# Patient Record
Sex: FEMALE | Race: BLACK OR AFRICAN AMERICAN | URBAN - METROPOLITAN AREA
[De-identification: names, ages, dates, MRNs, and addresses within clinical notes are randomized per-mention and may not be internally consistent; named-entity substitution may affect disease eponyms.]

---

## 2018-03-29 ENCOUNTER — OFFICE VISIT (OUTPATIENT)
Dept: FAMILY MEDICINE | Facility: CLINIC | Age: 33
End: 2018-03-29

## 2018-03-29 VITALS
OXYGEN SATURATION: 98 % | DIASTOLIC BLOOD PRESSURE: 62 MMHG | BODY MASS INDEX: 23.37 KG/M2 | TEMPERATURE: 98.5 F | SYSTOLIC BLOOD PRESSURE: 110 MMHG | RESPIRATION RATE: 14 BRPM | HEART RATE: 74 BPM | HEIGHT: 63 IN | WEIGHT: 131.9 LBS

## 2018-03-29 DIAGNOSIS — Z87.891 PERSONAL HISTORY OF TOBACCO USE, PRESENTING HAZARDS TO HEALTH: ICD-10-CM

## 2018-03-29 DIAGNOSIS — K25.4 GASTROINTESTINAL HEMORRHAGE ASSOCIATED WITH GASTRIC ULCER: Primary | ICD-10-CM

## 2018-03-29 LAB — HGB BLD-MCNC: 12.5 G/DL (ref 11.7–15.7)

## 2018-03-29 RX ORDER — CETIRIZINE HYDROCHLORIDE 10 MG/1
10 TABLET ORAL DAILY PRN
COMMUNITY

## 2018-03-29 ASSESSMENT — PAIN SCALES - GENERAL: PAINLEVEL: MODERATE PAIN (4)

## 2018-03-29 NOTE — Clinical Note
I have completed my note, please review, add tie in statement and close encounter.   Thanks!   Gautam [email or web address]

## 2018-03-29 NOTE — Clinical Note
I have completed my note, please review, add tie in statement and close encounter.   Thanks!   Gautam Jose

## 2018-03-29 NOTE — MR AVS SNAPSHOT
After Visit Summary   3/29/2018    Valentina Humphreys    MRN: [id number]           Patient Information     Date Of Birth          1985        Visit Information        Provider Department      3/29/2018 7:30 PM Clinician, Neil [facility]        Today's Diagnoses     Gastrointestinal hemorrhage associated with gastric ulcer    -  1    Personal history of tobacco use, presenting hazards to health          Care Instructions    Patient Visit Summary    Patient Name: Valentina Humphreys  MRN: [id number]    Date of Visit: 3/29/2018    Principle Diagnosis: Gastric Ulcer    Physician's Recommendations/Instructions:   -avoid ibuprofen, Excedrin, naproxen, Aleve  -take acetaminophen or Tylenol for pain, no more than 3000mg daily  -take omeprazole 40mg twice daily for 14 days, then once daily for 14 days  -use nicotine gum once every hour as needed     Lab Tests Performed: Hemoglobin     Physician: Dr. Luis Banda MD    Upcoming [facility] Specialty Nights:  -Dermatology: 4/9  -Women's Health Night: 4/12  -Audiology: 4/16  -Law: 4/23          Follow-ups after your visit        Who to contact     Please call your clinic at [phone] to:    Ask questions about your health    Make or cancel appointments    Discuss your medicines    Learn about your test results    Speak to your doctor            Additional Information About Your Visit        MyChart Information     FamilyLink[employer] is an electronic gateway that provides easy, online access to your medical records. With [employer] [facility], you can request a clinic appointment, read your test results, renew a prescription or communicate with your care team.     To sign up for [employer]Link[employer] visit the website at [email or web address]   You will be asked to enter the access code listed below, as well as some personal information. Please follow the directions to create your username and password.     Your access code is: [email or web address]  Expires: 6/27/2018 10:22 PM     Your access code  "will  in 90 days. If you need help or a new code, please contact your [facility] or call [phone] for assistance.        Care EveryWhere ID     This is your Care EveryWhere ID. This could be used by other organizations to access your Mobile medical records  [id number]        Your Vitals Were     Pulse Temperature Respirations Height Pulse Oximetry BMI (Body Mass Index)    74 98.5  F (36.9  C) (Oral) 14 5' 2.6\" (159 cm) 98% 23.67 kg/m2       Blood Pressure from Last 3 Encounters:   18 110/62    Weight from Last 3 Encounters:   18 131 lb 14.4 oz (59.8 kg)              We Performed the Following     Hemoglobin          Today's Medication Changes          These changes are accurate as of 3/29/18 10:22 PM.  If you have any questions, ask your nurse or doctor.               Start taking these medicines.        Dose/Directions    nicotine polacrilex 2 MG gum   Commonly known as:  NICORETTE   Used for:  Personal history of tobacco use, presenting hazards to health   Started by:  Neil Andrew        Dose:  2 mg   Place 1 each (2 mg) inside cheek every hour as needed for smoking cessation   Quantity:  110 tablet   Refills:  0       omeprazole 20 MG CR capsule   Commonly known as:  priLOSEC   Used for:  Gastrointestinal hemorrhage associated with gastric ulcer   Started by:  Neil Andrew        Take 2 capsules by mouth twice daily for 14 days, then 2 capsules by mouth once daily for 14 days.   Quantity:  84 capsule   Refills:  0            Where to get your medicines      Some of these will need a paper prescription and others can be bought over the counter.  Ask your nurse if you have questions.     Bring a paper prescription for each of these medications     nicotine polacrilex 2 MG gum    omeprazole 20 MG CR capsule                Primary Care Provider    None Specified       No primary provider on file.        Equal Access to Services     MIKE VALENCIA AH: " Hadii aad moom parrishkuno Sogallitoali, waaxda luqadaha, qaybta kaalmada vince, richard stumor jefferson. So [facility] [phone].    ATENCIÓN: Si habla titus, tiene a graves disposición servicios gratuitos de asistencia lingüística. Llame al [phone].    We comply with applicable federal civil rights laws and [place] laws. We do not discriminate on the basis of race, color, national origin, age, disability, sex, sexual orientation, or gender identity.            Thank you!     Thank you for choosing [facility] [place]  for your care. Our goal is always to provide you with excellent care. Hearing back from our patients is one way we can continue to improve our services. Please take a few minutes to complete the written survey that you may receive in the mail after your visit with us. Thank you!             Your Updated Medication List - Protect others around you: Learn how to safely use, store and throw away your medicines at www.disposemymeds.org.          This list is accurate as of 3/29/18 10:22 PM.  Always use your most recent med list.                   Brand Name Dispense Instructions for use Diagnosis    acetaminophen-caffeine 500-65 MG Tabs    EXCEDRIN TENSION HEADACHE     Take 2 tablets by mouth every 6 hours as needed for mild pain        cetirizine 10 MG tablet    zyrTEC     Take 10 mg by mouth daily as needed for allergies        IBUPROFEN PO      Take 200 mg by mouth every 6 hours as needed (mild pain)        LICORICE PO      Take 500 mg by mouth daily        nicotine polacrilex 2 MG gum    NICORETTE    110 tablet    Place 1 each (2 mg) inside cheek every hour as needed for smoking cessation    Personal history of tobacco use, presenting hazards to health       omeprazole 20 MG CR capsule    priLOSEC    84 capsule    Take 2 capsules by mouth twice daily for 14 days, then 2 capsules by mouth once daily for 14 days.    Gastrointestinal hemorrhage associated with gastric ulcer        PROBIOTIC PO      Take 1 capsule by mouth daily

## 2018-03-30 NOTE — PATIENT INSTRUCTIONS
Patient Visit Summary    Patient Name: Valentina Humphreys  MRN: [id number]    Date of Visit: 3/29/2018    Principle Diagnosis: Gastric Ulcer    Physician's Recommendations/Instructions:   -avoid ibuprofen, Excedrin, naproxen, Aleve  -take acetaminophen or Tylenol for pain, no more than 3000mg daily  -take omeprazole 40mg twice daily for 14 days, then once daily for 14 days  -use nicotine gum once every hour as needed     Lab Tests Performed: Hemoglobin     Physician: Dr. Luis Banda MD    Upcoming [facility] Specialty Nights:  -Dermatology: 4/9  -Women's Health Night: 4/12  -Audiology: 4/16  -Law: 4/23

## 2018-03-30 NOTE — NURSING NOTE
Patient is a 32-year-old female who came in with a complaint of a possible ulcer. Her reason for the possible pressure ulcer is from googling. She takes allergy pills for seasonal allergies and have a birth control implant on her upper left arm. She is also allergic to guava. Patient stated that she vomited twice within the last month when she went to drink out of town with friends. Her first vomit looked dark and her second vomit seemed to have blood. She feels this gnawing feeling in her stomach, and feels it being bloated and bubbly. She scores her pain as a 4/10 and says it's located three fingers above her belly button, but radiates to both sides of her abdomen. She wasn't paying attention to her pain before until when she noticed the textures of her vomit. Her pain does not feel sharp, but dull and constant. Due to her pain, vomit, and after googling her symptoms on google, she has gone cold turkey with her consumption of alcohol, caffeine, use of illicit drugs and tobacco. She stated that it has been about a couple of weeks. She used to drink 5 expresso shots a day and drank cocktails, approximately four, every night during the weekends. Some times she would drink during the weekday also, if there are special events going on. She did do MDMA and cocaine a couple times a month and used to smoke a pack a weekend or one cigarette a day. She has smoked since she was 14. Currently, she takes probiotics and have been using non-acidic replacements, such as DGL liquor or tea, for her fluid consumption. Her PHQ-2 score is a 0. Patient will benefit from a talk with a nutritionist regarding her eating habits due to stating that she occasionally drinks on an empty stomach.     Signed by: Armand Cho [employer] student nurse clinician

## 2018-03-30 NOTE — PROGRESS NOTES
"MEDICINE NOTE    SUBJECTIVE:  Ms. Valentina Humphreys is a 32 year-old female with a pertinent past medical history of 2.6 pack years of cigarettes with gnawing stomach pain. The patient reports constant, gnawing \"hunger pains\" that has worsened over the past month and a half. She reports 1.5 months ago, she had one episode of vomiting after drinking at least 4 vodka drinks over a 6 hour period. She states she noted a dark red tinge to the vomit and said she stopped drinking for the night at that point. 2 weeks ago, she reports having another episode of vomiting after drinking an unknown amount of alcohol for 10 hours at a hotel party with little food. She states the 2nd episode of vomiting had a bright red tinge and she was scared so she stopped drinking, drank water, and went to bed. Since then, the patient denies further episodes of vomiting, but still has constant, dull, achy, gnawing pain in her epigastric region that occasionally radiates into her che0st and makes her feel like her stomach is coming into her throat. She denies any burning sensation but reports feeling as though she needs to burp more frequently. The patient reports her stomach discomfort is a 4/10 at baseline and 5/10 at its worst. She denies any alleviating or aggravating factors. The patient states she quit smoking cigarettes and vaping e -cigarettes, stopped her alcohol intake completely, and stopped her caffeine intake (usually 5 espresso shots daily) 2 weeks ago after her last vomiting episode. The patient reports also taking 2-3 ibuprofen or Excedrin, 3 times a week for aches and pains associated with her  job. The patient reports fatigue, loose stools, and weight gain but denies any fevers, chills, weakness, or chest pain.   REVIEW OF SYSTEMS:  Gen: weight gain. no fevers, night sweats, chills  Eyes: no vision change, diplopia or red eyes  Ears, Noses, Mouth, Throat: no ringing in ears or hearing change, no epistaxis or nasal discharge, " "no oral lesions, throat clear  Cardiac: no chest pain, palpitations, or pain with walking  Lungs: cough  GI: see HPI  : no change in bladder habits  Skin: no rashes, concerning lesions or moles  Endo: no polyuria or polydipsia, no temperature intolerance  Allergy: guava  Psych: no depression or anxiety or change in mood    No past medical history on file.    No past surgical history on file.    No family history on file.    Social History     Social History     Marital status: Single     Spouse name: N/A     Number of children: N/A     Years of education: N/A     Occupational History     Not on file.     Social History Main Topics     Smoking status: Former Smoker     Packs/day: 0.20     Years: 18.00     Types: Cigarettes, Other     Quit date: 3/15/2018     Smokeless tobacco: Former User     Quit date: 3/15/2018      Comment: WO quit cold turkey but is interested in nictoine replacement methods to replace cravings.     Alcohol use 2.4 oz/week     4 Standard drinks or equivalent per week      Comment: quit since 3/15/18 due to symptoms     Drug use: 0.80 per week     Special: Cocaine, MDMA (Ecstasy)     Sexual activity: Not on file      Comment: nexplanon placed 1/2017     Other Topics Concern     Not on file     Social History Narrative    3/29/18: Patient did not need CHW services [facility] -,         OBJECTIVE:  Physical Exam:  /62 (BP Location: Right arm, Patient Position: Sitting, Cuff Size: Adult Regular)  Pulse 74  Temp 98.5  F (36.9  C) (Oral)  Resp 14  Ht 5' 2.6\" (159 cm)  Wt 131 lb 14.4 oz (59.8 kg)  SpO2 98%  BMI 23.67 kg/m2  Constitutional: no distress, comfortable, pleasant   Eyes: anicteric, normal extra-ocular movements   Ears, Nose and Throat: tympanic membranes clear, nose clear and free of lesions, throat clear, neck supple with full range of motion, no thyromegaly.   Cardiovascular: regular rate and rhythm, normal S1 and S2, no murmurs, rubs or gallops, peripheral pulses full and " symmetric   Respiratory: clear to auscultation, no wheezes or crackles, normal breath sounds   Gastrointestinal: positive bowel sounds, nontender, no hepatosplenomegaly, no masses, negative de los santos sign  Skin: no concerning lesions, no jaundice   Neurological: cranial nerves intact, normal strength and sensation, reflexes at patella and biceps normal, normal gait, no tremor   Psychological: appropriate mood       ASSESSMENT/PLAN:    1. Gastric Ulcer Disease  Labs: Hemoglobinto assess for anemia secondary to gastric hemorrhage. Will call patient if results are consistent with anemia and prescribe iron tablet.  Medications: Omeprazole 20 mg 2 capsules bid for 14 days, 2 capsules daily for 14 days after.   Plan: Return to clinic in 2 weeks for further evaluation should your pain and symptoms stay the same or worsen.Patient advised to discontinue alcohol, ibuprofen, excedrin, and tobacco use. Pt advised to avoid spicy foods.    2. Tobacco Cessation  Medications: nicorette 2 mg gum, 1 gum prn for smoking cessation    3. Reproductive Health Maintenance   Plan: Patient informed about WHN on 4/12 and advised to return to clinic for routine STD and PAP testing.    4. Chronic Back Pain   Plan: PT Fast Pass given.    5. Diet Counseling  Plan: Nutrition Fast Pass given.    Med Clinician: Marisol Gonzales  Preceptor: Liza Banda M.D.    This is a 32-year-old female with a past medical history significant for tobacco abuse, binge drinking, and persistent gnawing epigastric pain who presented to the clinic today with worsening epigastric pain with associated hematemesis.  Possible differentials includes peptic ulcer disease versus gastritis versus GERD.  Patient's history and physical examination are consistent with gastritis likely secondary to tobacco abuse, chronic alcohol abuse, and chronic NSAIDs use.  Extensive discussion was done today regarding the discontinuation of precipitating factors.  High-dose omeprazole 40 mg twice  daily was initiated for 2 weeks and then 40 mg daily.  Nicotine gum was also prescribed.  Due to reported hematemesis hemoglobin level was ordered which came back unremarkable.  Patient will follow-up in 2 weeks for further evaluation and management.  Endoscopy and H. pylori test can be considered if pain symptoms persist or worsens.    Liza Burkett MD  PGY3 [facility] Resident   Pager: [phone]    I am signing this for Dr. Banda who has been unable to sign this note in a timely manner.  The content of this note has been reviewed by the preceptor for accuracy.  I did not participate in the care of this patient.  Marco Flores MD - Medical Director, [facility]

## 2018-03-30 NOTE — PROGRESS NOTES
"[facility] Progress Note    Chief complaint: Epigastric pain     Subjective:  Conditions 1 and 2:  Gastric ulcer and smoking cessation  ASH is a 32 year-old female with a pertinent past medical history of 2.6 pack years of cigarettes with gnawing stomach pain. The patient reports constant, gnawing \"hunger pains\" that has worsened over the past month and a half. She reports 1.5 months ago, she had one episode of vomiting after drinking at least 4 vodka drinks over a 6 hour period. She states she noted a dark red tinge to the vomit and said she stopped drinking for the night at that point. 2 weeks ago, she reports having another episode of vomiting after drinking an unknown amount of alcohol for 10 hours at a hotel party with little food. She states the 2nd episode of vomiting had a bright red tinge and she was scared so she stopped drinking, drank water, and went to bed. Since then, the patient denies further episodes of vomiting, but still has constant, dull, achy, gnawing pain in her epigastric region that occasionally radiates into her chest and makes her feel like her stomach is coming into her throat. She denies any burning sensation but reports feeling as though she needs to burp more frequently. The patient reports her stomach discomfort is a 4/10 at baseline and 5/10 at its worst. She denies any alleviating or aggravating factors. The patient states she quit smoking cigarettes and vaping e -cigarettes, stopped her alcohol intake completely, and stopped her caffeine intake (usually 5 espresso shots daily) 2 weeks ago after her last vomiting episode. The patient reports also taking 2-3 ibuprofen or Excedrin, 3 times a week for aches and pains associated with her  job. The patient reports fatigue, loose stools, and weight gain but denies any fevers, chills, weakness, or chest pain.     Current Outpatient Prescriptions   Medication Sig Dispense Refill     cetirizine (ZYRTEC) 10 MG tablet Take 10 mg by " "mouth daily as needed for allergies       acetaminophen-caffeine (EXCEDRIN TENSION HEADACHE) 500-65 MG TABS Take 2 tablets by mouth every 6 hours as needed for mild pain       IBUPROFEN PO Take 200 mg by mouth every 6 hours as needed (mild pain)       Licorice, Glycyrrhiza glabra, (LICORICE PO) Take 500 mg by mouth daily       Probiotic Product (PROBIOTIC PO) Take 1 capsule by mouth daily       omeprazole (PRILOSEC) 20 MG CR capsule Take 2 capsules by mouth twice daily for 14 days, then 2 capsules by mouth once daily for 14 days. 84 capsule 0     nicotine polacrilex (NICORETTE) 2 MG gum Place 1 each (2 mg) inside cheek every hour as needed for smoking cessation 110 tablet 0         Objective:   /62 (BP Location: Right arm, Patient Position: Sitting, Cuff Size: Adult Regular)  Pulse 74  Temp 98.5  F (36.9  C) (Oral)  Resp 14  Ht 5' 2.6\" (159 cm)  Wt 131 lb 14.4 oz (59.8 kg)  SpO2 98%  BMI 23.67 kg/m2    Assessment:     Condition 1- Gastric ulcer  DTP: Needs Additional Therapy: untreated condition   Rationale: Pt has been experiencing dull, constant epigastric pain with aggregating factors, appropriate primary treatment is initiation of proton pump inhibitor. The only proton pump inhibitor in the [facility] is omeprazole. Dosing regimen of omeprazole for gastric ulcers can go up to 40mg twice daily. Life styles changes to improve gastric ulcer include decreased alcohol consumption, discontinuing NSAID use, and decreased coffee consumption.    Condition 2- Smoking cessation  DTP: Needs Additional Therapy: untreated condition   Rationale: Pt has taken initiative to stop smoking but needs additional assistance with controlling cravings, taking her previous smoking habits into account nicotine gum is the appropriate choice. Per the 2012 AHRQ Guidelines nicotine gum 2mg as needed is the indicated for pt's who don't have immediate morning cravings and for less than 10 cigarettes daily.    Plan:  1. Initiate 40mg " omeprazole by mouth twice daily for the first 14 days, then 40mg by mouth once daily for the following 14 days.  2. Initiate 2mg nicotine gum by buccal once every hour as needed.  3. Discontinue OTC use of NSAIDS. Recommend acetaminophen use for aches and pains, no more than 3000mg per day.  4. Recommend pt to adhere to lifestyle changes stated above for gastric ulcer.  5. Continue probiotic and licorice use and PRN zyrtec use.    Pharmacy Follow-Up Plan (Method, Date, Parameters): Follow up over phone in one week to assess for relief of gastric ulcer symptoms such as epigastric pain and vomiting and appearance of side effect such as abdominal pain, constipation, and headache. Also assess for relief of nicotine cravings as well as side effects such as diarrhea, indigestion, and oral irritation.    PharmCare Clinician: Gautam Jose  Pharmacy Preceptor: Dana Chua    _____________________________  Preceptor Use Only:  In supervising the student, I have reviewed and verified the student's documentation and found it to be correct and complete.   Preceptor Signature: Dana Chua, PharmD  HPI      ROS      Physical Exam

## 2018-03-30 NOTE — PROGRESS NOTES
Dental CC:   Valentina Humphreys, a 32 year old female presents with the following dental concerns: no dental insurance    Head and Neck Exam:   Extraoral findings: No     Intraoral Findings: No    Found minor staining of enamel    Explanation of positive findings:    Educated on:    Proper oral hygiene: Yes   Need for preventative dental services: Yes   Importance of catching oral disease processes at earliest stages: Yes    All questions answered and referred patient to    Additional notes for follow-up:    Dental exam completed by: Jose Cevallos

## 2018-04-01 NOTE — PROGRESS NOTES
"NUTRITION NOTE    Patient Name: Valentina Humphreys   MRN: [id number]    Reason for assessment:  Concern about Possible Stomach Ulcer    Anthropometrics  Admit Weight:   Height: 5' 2.598\"   Current weight: 131 lbs 14.4 oz   Body mass index is 23.67 kg/(m^2).     Assessment  Relevant nutritional labs: BP: 110/62, Pulse: 74, Resp: 14, Temp: 98.5F    Nutrition problem: food and nutrition knowledge deficit    Related to: stomach pain    As indicated by: self report    Nutrition Counseling: Patient reported that she began to Google her symptoms 2 weeks ago after she began to have stomach pain. She said that it's possible she may have a stomach ulcer. Patient reported that immediately after eating, she begins to feel hungry. She reported that sometimes she isn't sure if it's hunger pain or stomach pain. She said that she has been trying to eat more, but is concerned about gaining weight. She said that she is a , and is active, and also bikes a lot. She said that she has stopped eating foods with a lot of acid, coffee, and espresso. She said that she has began taking a probiotic and has stopped drinking and smoking \"cold turkey\". When asked about her diet history, patient said that she sometimes skips breakfast, but will have a snack like cookies or pastries once she gets to work. She may have soup or a salad at work. She said when she gets home she eats a meal with her partner which is usually pasta and chicken.     Those present during counseling: Abida Rey (preceptor), Ellyn Ballesteros (nutrition clinician)    Instructed on: Nutrition education included for patient to stop eating spicy food, carbonated drinks, high fat foods like pastries, cookies to not further discomfort stomach. The importance of not eating chocolate because it has caffeine was expressed, as well that 1% milk and herbal tea was okay to drink. It was encouraged to patient to be sure to eat breakfast. Patient was given a handout with information with " foods okay to eat, and foods to avoid on it.     Understanding of diet demonstrated: Patient demonstrated good understanding of diet demonstrated    Predicted compliance: noncompliant some of the time    Plan: To be sure to eat breakfast in the mornings, and avoid foods that would discomfort the stomach as well as foods that were okay for the stomach.     Nutrition Clinician: Ellyn Ballesteros  Preceptor: Abida Rey, EVELIN, LD, CDJANET    In supervising the dietetic student, I repeated the exam documented above.  I have reviewed and verified the student s documentation.  Supervising Provider: Suzette Wan, SHAWNN, LD, NOLA

## 2018-04-10 NOTE — PROGRESS NOTES
"[facility] Progress Note    Chief complaint: Epigastric pain     Subjective:  Conditions 1 and 2:  Gastric ulcer and smoking cessation  ASH is a 32 year-old female with a pertinent past medical history of 2.6 pack years of cigarettes with gnawing stomach pain. The patient reports constant, gnawing \"hunger pains\" that has worsened over the past month and a half. She reports 1.5 months ago, she had one episode of vomiting after drinking at least 4 vodka drinks over a 6 hour period. She states she noted a dark red tinge to the vomit and said she stopped drinking for the night at that point. 2 weeks ago, she reports having another episode of vomiting after drinking an unknown amount of alcohol for 10 hours at a hotel party with little food. She states the 2nd episode of vomiting had a bright red tinge and she was scared so she stopped drinking, drank water, and went to bed. Since then, the patient denies further episodes of vomiting, but still has constant, dull, achy, gnawing pain in her epigastric region that occasionally radiates into her chest and makes her feel like her stomach is coming into her throat. She denies any burning sensation but reports feeling as though she needs to burp more frequently. The patient reports her stomach discomfort is a 4/10 at baseline and 5/10 at its worst. She denies any alleviating or aggravating factors. The patient states she quit smoking cigarettes and vaping e -cigarettes, stopped her alcohol intake completely, and stopped her caffeine intake (usually 5 espresso shots daily) 2 weeks ago after her last vomiting episode. The patient reports also taking 2-3 ibuprofen or Excedrin, 3 times a week for aches and pains associated with her  job. The patient reports fatigue, loose stools, and weight gain but denies any fevers, chills, weakness, or chest pain.     Current Outpatient Prescriptions   Medication Sig Dispense Refill     cetirizine (ZYRTEC) 10 MG tablet Take 10 mg by " "mouth daily as needed for allergies       acetaminophen-caffeine (EXCEDRIN TENSION HEADACHE) 500-65 MG TABS Take 2 tablets by mouth every 6 hours as needed for mild pain       IBUPROFEN PO Take 200 mg by mouth every 6 hours as needed (mild pain)       Licorice, Glycyrrhiza glabra, (LICORICE PO) Take 500 mg by mouth daily       Probiotic Product (PROBIOTIC PO) Take 1 capsule by mouth daily       omeprazole (PRILOSEC) 20 MG CR capsule Take 2 capsules by mouth twice daily for 14 days, then 2 capsules by mouth once daily for 14 days. 84 capsule 0     nicotine polacrilex (NICORETTE) 2 MG gum Place 1 each (2 mg) inside cheek every hour as needed for smoking cessation 110 tablet 0         Objective:   /62 (BP Location: Right arm, Patient Position: Sitting, Cuff Size: Adult Regular)  Pulse 74  Temp 98.5  F (36.9  C) (Oral)  Resp 14  Ht 5' 2.6\" (159 cm)  Wt 131 lb 14.4 oz (59.8 kg)  SpO2 98%  BMI 23.67 kg/m2    Assessment:     Condition 1- Gastric ulcer  DTP: Needs Additional Therapy: untreated condition   Rationale: Pt has been experiencing dull, constant epigastric pain with aggregating factors, appropriate primary treatment is initiation of proton pump inhibitor. The only proton pump inhibitor in the [facility] is omeprazole. Dosing regimen of omeprazole for gastric ulcers can go up to 40mg twice daily. Life styles changes to improve gastric ulcer include decreased alcohol consumption, discontinuing NSAID use, and decreased coffee consumption.    Condition 2- Smoking cessation  DTP: Needs Additional Therapy: untreated condition   Rationale: Pt has taken initiative to stop smoking but needs additional assistance with controlling cravings, taking her previous smoking habits into account nicotine gum is the appropriate choice. Per the 2012 AHRQ Guidelines nicotine gum 2mg as needed is the indicated for pt's who don't have immediate morning cravings and for less than 10 cigarettes daily.    Plan:  1. Initiate 40mg " omeprazole by mouth twice daily for the first 14 days, then 40mg by mouth once daily for the following 14 days.  2. Initiate 2mg nicotine gum by buccal once every hour as needed.  3. Discontinue OTC use of NSAIDS. Recommend acetaminophen use for aches and pains, no more than 3000mg per day.  4. Recommend pt to adhere to lifestyle changes stated above for gastric ulcer.  5. Continue probiotic and licorice use and PRN zyrtec use.    Pharmacy Follow-Up Plan (Method, Date, Parameters): Follow up over phone in one week to assess for relief of gastric ulcer symptoms such as epigastric pain and vomiting and appearance of side effect such as abdominal pain, constipation, and headache. Also assess for relief of nicotine cravings as well as side effects such as diarrhea, indigestion, and oral irritation.    PharmCare Clinician: Gautam Jose  Pharmacy Preceptor: Dana Chua    _____________________________  Preceptor Use Only:  In supervising the student, I have reviewed and verified the student's documentation and found it to be correct and complete.   Preceptor Signature: Dana Chua, PharmD

## 2018-04-10 NOTE — PROGRESS NOTES
"[facility] Progress Note    Chief complaint: ***    Last date of full med (Med Refill only):    Subjective:  Condition 1: ***  Condition 2: ***   Condition 3: ***   Condition 4: ***  Current Outpatient Prescriptions   Medication Sig Dispense Refill     cetirizine (ZYRTEC) 10 MG tablet Take 10 mg by mouth daily as needed for allergies       acetaminophen-caffeine (EXCEDRIN TENSION HEADACHE) 500-65 MG TABS Take 2 tablets by mouth every 6 hours as needed for mild pain       IBUPROFEN PO Take 200 mg by mouth every 6 hours as needed (mild pain)       Licorice, Glycyrrhiza glabra, (LICORICE PO) Take 500 mg by mouth daily       Probiotic Product (PROBIOTIC PO) Take 1 capsule by mouth daily       omeprazole (PRILOSEC) 20 MG CR capsule Take 2 capsules by mouth twice daily for 14 days, then 2 capsules by mouth once daily for 14 days. 84 capsule 0     nicotine polacrilex (NICORETTE) 2 MG gum Place 1 each (2 mg) inside cheek every hour as needed for smoking cessation 110 tablet 0         Objective: ***  /62 (BP Location: Right arm, Patient Position: Sitting, Cuff Size: Adult Regular)  Pulse 74  Temp 98.5  F (36.9  C) (Oral)  Resp 14  Ht 5' 2.6\" (159 cm)  Wt 131 lb 14.4 oz (59.8 kg)  SpO2 98%  BMI 23.67 kg/m2    Assessment:     Condition 1- ***  DTP: {DTP PROBLEMS:307110}  Rationale: ***    Condition 2- ***  DTP: {DTP PROBLEMS:936732}  Rationale: ***    Condition 3- ***  DTP: {DTP PROBLEMS:679243}  Rationale: ***    Condition 4- ***  DTP: {DTP PROBLEMS:899456}  Rationale: ***    Plan:  1. ***  2. ***  3. ***  4. ***    Pharmacy Follow-Up Plan (Method, Date, Parameters): ***    PharmCare Clinician: ***  Pharmacy Preceptor: ***    _____________________________  Preceptor Use Only:  In supervising the student, I have reviewed and verified the student's documentation and found it to be correct and complete.   Preceptor Signature: ***    "

## 2018-04-12 ENCOUNTER — TELEPHONE (OUTPATIENT)
Dept: FAMILY MEDICINE | Facility: CLINIC | Age: 33
End: 2018-04-12

## 2018-04-12 NOTE — TELEPHONE ENCOUNTER
----- Message from Gautam Jose sent at 3/29/2018 10:41 PM CDT -----  Regarding: f/u on 04/04/18 ENGLISH  WO presented to clinic with epigastric pain and was diagnosed with gastric ulcer.    Initiated:  Omeprazole  Take 40mg twice daily for 14 days, then 40mg once daily for 14 days.    Nicotine gum:  Take 2mg buccal every hour as needed.    Follow up over phone in one week to assess for relief of gastric ulcer symptoms such as epigastric pain and vomiting and appearance of side effect such as abdominal pain, constipation, and headache. Also assess for relief of nicotine cravings as well as side effects such as diarrhea, indigestion, and oral irritation.

## 2018-04-19 NOTE — TELEPHONE ENCOUNTER
Patient states omeprazole therapy has been going well. Prescription for omeprazole 20 mg capsules was given, take 2 capsules by mouth twice daily. Patient mistakenly started therapy taking 1 capsule by mouth twice daily. She has since started taking 2 capsules by mouth twice daily and is aware that she should still decrease to 1 capsule twice daily. She reports no adverse effects. When asked about nicotine gum, patient reports she has not been using it and has also not been smoking.

## 2018-04-20 NOTE — TELEPHONE ENCOUNTER
Patient s case reviewed. I agree with the written assessment and plan of care.    Lea Oliveros, PharmD.